# Patient Record
Sex: FEMALE | Race: WHITE
[De-identification: names, ages, dates, MRNs, and addresses within clinical notes are randomized per-mention and may not be internally consistent; named-entity substitution may affect disease eponyms.]

---

## 2020-06-22 NOTE — NUR
Patient called for her COVID-19 laboratory test results. Patient was notify of her "Not 
Detected" status and instructed to follow the recommended preventive measures (Universal 
Source Control), patient confirmed verbal understand.

## 2020-06-23 ENCOUNTER — HOSPITAL ENCOUNTER (OUTPATIENT)
Dept: HOSPITAL 4 - SMU | Age: 25
Discharge: HOME | End: 2020-06-23
Payer: COMMERCIAL

## 2020-06-23 VITALS — HEIGHT: 59 IN | WEIGHT: 130 LBS | BODY MASS INDEX: 26.21 KG/M2

## 2020-06-23 VITALS — SYSTOLIC BLOOD PRESSURE: 104 MMHG

## 2020-06-23 DIAGNOSIS — O02.1: Primary | ICD-10-CM

## 2020-06-23 DIAGNOSIS — Z79.899: ICD-10-CM

## 2020-06-23 DIAGNOSIS — Z11.59: ICD-10-CM

## 2020-06-23 PROCEDURE — 59820 CARE OF MISCARRIAGE: CPT

## 2020-06-23 PROCEDURE — 88305 TISSUE EXAM BY PATHOLOGIST: CPT
